# Patient Record
Sex: MALE | Race: WHITE | NOT HISPANIC OR LATINO | Employment: FULL TIME | URBAN - METROPOLITAN AREA
[De-identification: names, ages, dates, MRNs, and addresses within clinical notes are randomized per-mention and may not be internally consistent; named-entity substitution may affect disease eponyms.]

---

## 2022-12-13 ENCOUNTER — HOSPITAL ENCOUNTER (EMERGENCY)
Facility: HOSPITAL | Age: 37
Discharge: HOME | End: 2022-12-13
Attending: EMERGENCY MEDICINE
Payer: COMMERCIAL

## 2022-12-13 VITALS
RESPIRATION RATE: 18 BRPM | HEART RATE: 54 BPM | DIASTOLIC BLOOD PRESSURE: 93 MMHG | SYSTOLIC BLOOD PRESSURE: 170 MMHG | BODY MASS INDEX: 25.18 KG/M2 | TEMPERATURE: 99 F | WEIGHT: 170 LBS | OXYGEN SATURATION: 100 % | HEIGHT: 69 IN

## 2022-12-13 DIAGNOSIS — E80.6 HYPERBILIRUBINEMIA: Primary | ICD-10-CM

## 2022-12-13 DIAGNOSIS — R04.2 HEMOPTYSIS: ICD-10-CM

## 2022-12-13 LAB
ALBUMIN SERPL BCP-MCNC: 4.3 G/DL (ref 3.5–5.2)
ALP SERPL-CCNC: 73 U/L (ref 55–135)
ALT SERPL W/O P-5'-P-CCNC: 41 U/L (ref 10–44)
ANION GAP SERPL CALC-SCNC: 11 MMOL/L (ref 8–16)
APTT BLDCRRT: 28.2 SEC (ref 21–32)
AST SERPL-CCNC: 40 U/L (ref 10–40)
BASOPHILS # BLD AUTO: 0.04 K/UL (ref 0–0.2)
BASOPHILS NFR BLD: 0.5 % (ref 0–1.9)
BILIRUB SERPL-MCNC: 1.5 MG/DL (ref 0.1–1)
BILIRUB UR QL STRIP: NEGATIVE
BNP SERPL-MCNC: 52 PG/ML (ref 0–99)
BUN SERPL-MCNC: 21 MG/DL (ref 6–20)
CALCIUM SERPL-MCNC: 9.6 MG/DL (ref 8.7–10.5)
CHLORIDE SERPL-SCNC: 103 MMOL/L (ref 95–110)
CLARITY UR REFRACT.AUTO: CLEAR
CO2 SERPL-SCNC: 25 MMOL/L (ref 23–29)
COLOR UR AUTO: COLORLESS
CREAT SERPL-MCNC: 1.1 MG/DL (ref 0.5–1.4)
DIFFERENTIAL METHOD: ABNORMAL
EOSINOPHIL # BLD AUTO: 0.1 K/UL (ref 0–0.5)
EOSINOPHIL NFR BLD: 1.1 % (ref 0–8)
ERYTHROCYTE [DISTWIDTH] IN BLOOD BY AUTOMATED COUNT: 12.3 % (ref 11.5–14.5)
EST. GFR  (NO RACE VARIABLE): >60 ML/MIN/1.73 M^2
GLUCOSE SERPL-MCNC: 92 MG/DL (ref 70–110)
GLUCOSE UR QL STRIP: NEGATIVE
HCT VFR BLD AUTO: 44.8 % (ref 40–54)
HCV AB SERPL QL IA: NORMAL
HGB BLD-MCNC: 15.5 G/DL (ref 14–18)
HGB UR QL STRIP: NEGATIVE
HIV 1+2 AB+HIV1 P24 AG SERPL QL IA: NORMAL
IMM GRANULOCYTES # BLD AUTO: 0.03 K/UL (ref 0–0.04)
IMM GRANULOCYTES NFR BLD AUTO: 0.4 % (ref 0–0.5)
INR PPP: 1 (ref 0.8–1.2)
KETONES UR QL STRIP: NEGATIVE
LACTATE SERPL-SCNC: 1 MMOL/L (ref 0.5–2.2)
LEUKOCYTE ESTERASE UR QL STRIP: NEGATIVE
LYMPHOCYTES # BLD AUTO: 1.2 K/UL (ref 1–4.8)
LYMPHOCYTES NFR BLD: 16.2 % (ref 18–48)
MCH RBC QN AUTO: 31.3 PG (ref 27–31)
MCHC RBC AUTO-ENTMCNC: 34.6 G/DL (ref 32–36)
MCV RBC AUTO: 91 FL (ref 82–98)
MONOCYTES # BLD AUTO: 0.4 K/UL (ref 0.3–1)
MONOCYTES NFR BLD: 5.2 % (ref 4–15)
NEUTROPHILS # BLD AUTO: 5.8 K/UL (ref 1.8–7.7)
NEUTROPHILS NFR BLD: 76.6 % (ref 38–73)
NITRITE UR QL STRIP: NEGATIVE
NRBC BLD-RTO: 0 /100 WBC
PH UR STRIP: 5 [PH] (ref 5–8)
PLATELET # BLD AUTO: 249 K/UL (ref 150–450)
PMV BLD AUTO: 9.6 FL (ref 9.2–12.9)
POTASSIUM SERPL-SCNC: 4.3 MMOL/L (ref 3.5–5.1)
PROT SERPL-MCNC: 7.8 G/DL (ref 6–8.4)
PROT UR QL STRIP: NEGATIVE
PROTHROMBIN TIME: 10.4 SEC (ref 9–12.5)
RBC # BLD AUTO: 4.95 M/UL (ref 4.6–6.2)
SODIUM SERPL-SCNC: 139 MMOL/L (ref 136–145)
SP GR UR STRIP: 1 (ref 1–1.03)
TROPONIN I SERPL DL<=0.01 NG/ML-MCNC: 0.01 NG/ML (ref 0–0.03)
URN SPEC COLLECT METH UR: ABNORMAL
WBC # BLD AUTO: 7.57 K/UL (ref 3.9–12.7)

## 2022-12-13 PROCEDURE — 85730 THROMBOPLASTIN TIME PARTIAL: CPT | Performed by: PHYSICIAN ASSISTANT

## 2022-12-13 PROCEDURE — 85025 COMPLETE CBC W/AUTO DIFF WBC: CPT | Performed by: PHYSICIAN ASSISTANT

## 2022-12-13 PROCEDURE — 93308 PR ECHO HEART XTHORACIC,LIMITED: ICD-10-PCS | Mod: 26,,, | Performed by: EMERGENCY MEDICINE

## 2022-12-13 PROCEDURE — 87040 BLOOD CULTURE FOR BACTERIA: CPT | Mod: 59 | Performed by: PHYSICIAN ASSISTANT

## 2022-12-13 PROCEDURE — 87389 HIV-1 AG W/HIV-1&-2 AB AG IA: CPT | Performed by: PHYSICIAN ASSISTANT

## 2022-12-13 PROCEDURE — 85610 PROTHROMBIN TIME: CPT | Performed by: PHYSICIAN ASSISTANT

## 2022-12-13 PROCEDURE — 25500020 PHARM REV CODE 255: Performed by: EMERGENCY MEDICINE

## 2022-12-13 PROCEDURE — 96360 HYDRATION IV INFUSION INIT: CPT

## 2022-12-13 PROCEDURE — 93308 TTE F-UP OR LMTD: CPT | Mod: 26,,, | Performed by: EMERGENCY MEDICINE

## 2022-12-13 PROCEDURE — 99285 EMERGENCY DEPT VISIT HI MDM: CPT | Mod: 25,,, | Performed by: EMERGENCY MEDICINE

## 2022-12-13 PROCEDURE — 81003 URINALYSIS AUTO W/O SCOPE: CPT | Performed by: PHYSICIAN ASSISTANT

## 2022-12-13 PROCEDURE — 84484 ASSAY OF TROPONIN QUANT: CPT | Performed by: PHYSICIAN ASSISTANT

## 2022-12-13 PROCEDURE — 99285 EMERGENCY DEPT VISIT HI MDM: CPT | Mod: 25

## 2022-12-13 PROCEDURE — 83605 ASSAY OF LACTIC ACID: CPT | Performed by: PHYSICIAN ASSISTANT

## 2022-12-13 PROCEDURE — 83880 ASSAY OF NATRIURETIC PEPTIDE: CPT | Performed by: PHYSICIAN ASSISTANT

## 2022-12-13 PROCEDURE — 93005 ELECTROCARDIOGRAM TRACING: CPT

## 2022-12-13 PROCEDURE — 93010 EKG 12-LEAD: ICD-10-PCS | Mod: ,,, | Performed by: INTERNAL MEDICINE

## 2022-12-13 PROCEDURE — 99285 PR EMERGENCY DEPT VISIT,LEVEL V: ICD-10-PCS | Mod: 25,,, | Performed by: EMERGENCY MEDICINE

## 2022-12-13 PROCEDURE — 80053 COMPREHEN METABOLIC PANEL: CPT | Performed by: PHYSICIAN ASSISTANT

## 2022-12-13 PROCEDURE — 86803 HEPATITIS C AB TEST: CPT | Performed by: PHYSICIAN ASSISTANT

## 2022-12-13 PROCEDURE — 93010 ELECTROCARDIOGRAM REPORT: CPT | Mod: ,,, | Performed by: INTERNAL MEDICINE

## 2022-12-13 PROCEDURE — 25000003 PHARM REV CODE 250: Performed by: PHYSICIAN ASSISTANT

## 2022-12-13 RX ORDER — FLUOXETINE 20 MG/1
20 TABLET ORAL DAILY
COMMUNITY

## 2022-12-13 RX ADMIN — IOHEXOL 75 ML: 350 INJECTION, SOLUTION INTRAVENOUS at 05:12

## 2022-12-13 RX ADMIN — SODIUM CHLORIDE 1000 ML: 0.9 INJECTION, SOLUTION INTRAVENOUS at 03:12

## 2022-12-13 NOTE — Clinical Note
"Sean Piña" Drea was seen and treated in our emergency department on 12/13/2022.  He may return to work on 12/14/2022.       If you have any questions or concerns, please don't hesitate to call.      Alma Kincaid PA-C"

## 2022-12-13 NOTE — DISCHARGE INSTRUCTIONS
Your workup today is unremarkable.  No leukocytosis, anemia, platelets dysfunction, electrolyte abnormalities, kidney injury.  PT and PTT within normal limits.  Your cardiac workup is unremarkable.  Your CT does not show signs of a PE or pulmonary disease.  You are negative for HIV and hepatitis-C.  You can refer to your results on [facility].  Follow up with PCP.   Return to the ER for new or worsening symptoms.   Labs Reviewed   CBC W/ AUTO DIFFERENTIAL - Abnormal; Notable for the following components:       Result Value    MCH 31.3 (*)     Gran % 76.6 (*)     Lymph % 16.2 (*)     All other components within normal limits   COMPREHENSIVE METABOLIC PANEL - Abnormal; Notable for the following components:    BUN 21 (*)     Total Bilirubin 1.5 (*)     All other components within normal limits   URINALYSIS, REFLEX TO URINE CULTURE - Abnormal; Notable for the following components:    Color, UA Colorless (*)     All other components within normal limits    Narrative:     Specimen Source->Urine   CULTURE, BLOOD   CULTURE, BLOOD   HIV 1 / 2 ANTIBODY    Narrative:     Release to patient->Immediate   HEPATITIS C ANTIBODY    Narrative:     Release to patient->Immediate   APTT   PROTIME-INR   TROPONIN I   B-TYPE NATRIURETIC PEPTIDE   LACTIC ACID, PLASMA     Imaging Results              CTA Chest Non-Coronary (PE Studies) (Final result)  Result time 12/13/22 17:40:55      Final result by Bala Reilly MD (12/13/22 17:40:55)                   Impression:      1. No evidence of acute pulmonary thromboembolism allowing for poor concentration of contrast in the pulmonary arterial system..  2. If there is a high suspicion, consider V/Q scan or repeat IV contrast with 2nd scan of the pulmonary arterial tree.  3. No evidence of pulmonary nodule, consolidation or cavitary lesion to explain hemoptysis.  4. Negative included images of the neck.  5. Maxillary sinus disease.      Electronically signed by: Bala  Tommie  Date:    12/13/2022  Time:    17:40               Narrative:    EXAMINATION:  CTA CHEST NON CORONARY (PE STUDIES)    CLINICAL HISTORY:  Pulmonary embolism (PE) suspected, unknown D-dimer;    TECHNIQUE:  Following the intravenous administration of 75 cc of Omnipaque 350 contrast material, 1.25 mm contiguous axial images were acquired through the chest utilizing the CT pulmonary angiogram protocol.  2-D coronal and sagittal reconstructed images of the chest and sagittal oblique MIP images of the pulmonary arterial system were generated from the source data.    COMPARISON:  None.    FINDINGS:  Pulmonary arterial system: This is a poor quality examination for the evaluation of pulmonary thromboembolism with moderate opacification of the pulmonary arteries to the level of the segmental branches of the pulmonary arterial system.  The study is in the systemic arterial phase.  There is no evidence of acute pulmonary thromboembolism. No large saddle pulmonary embolism, enlargement of the main pulmonary artery, or secondary findings of right ventricular strain.    Aorta and heart: The heart is normal in size.  No pericardial fluid.  No thoracic aortic aneurysm.  No thoracic aortic dissection.    Airways: Unremarkable.    Lymph nodes: No pathologically enlarged lymph nodes in the chest or axilla.    Lungs: The lungs are clear with no evidence of airspace consolidation, mass, or bronchiectasis.  No emphysematous lung architecture.    Pleura: No significant volume of pleural fluid or pneumothorax.  No pleural based masses.    Visualized upper abdominal and visualized neck soft tissues: No significant abnormalities appreciated.  Mucosal thickening within both maxillary sinuses.    Bones: There is degenerative change of the thoracic spine.

## 2022-12-13 NOTE — ED PROVIDER NOTES
"Encounter Date: 12/13/2022       History     Chief Complaint   Patient presents with    Hemoptysis     Hemoptysis that began today after 11. Not on blood thinners. Has happened before in July of this year. Had CT but didn't find anything. Denies pain.      2:07 PM  Patient is a 37-year-old male with a history of past COVID infection, paroxysmal sinus tach, tendinitis of the right hip, cavitary lesion of the left lobe who presents to [facility] ED with hemoptysis onset today.    Patient is in town for the Hematology conference.  He is a fellow.  He states after lunch he felt like he needed to clear his throat and coughed up blood clots.  States that it was few and little but it lasted about 15-30 minutes.  States that he could taste blood in the back of his throat.  States that this is similar to his episode in July when he had a left upper lobe cavitary lesion and was treated with antibiotics.  He denies feeling ill, prior cough, chest congestion, epistaxis, bloody rhinorrhea, SOB. He had mild upper chest pain, but felt it was MSK.      He denies aspirin use, blood thinner use, chronic NSAID use, hormone use, tobacco use, history of personal cancer.    He did have a flat 5 hour plane ride from [place] to [place].  No lower extremity pain or swelling.    On chart review:  CT scan on 7/18/2022 8:59 PM showed: An ill-defined region of groundglass surrounding cavitary change in the left lung apex is likely etiology of hemoptysis.   Follow up CT about 2 months later showed nearly completely resolution of the previously seen groundglass and cavitary change in the left upper lobe, suggesting resolved infection/inflammation. No new lung lesions.   "He was discharged home with a course of cefpodoxime and azithromycin. All his work up has returned negative including sputum AFB x 3 smears (cultures in process), QuantGold, limited serologies to screen for vasculitis."    Review of patient's allergies indicates:   Allergen " Reactions    Bactrim [sulfamethoxazole-trimethoprim] Other (See Comments)     unknown     Past Medical History:   Diagnosis Date    Generalized anxiety disorder      Past Surgical History:   Procedure Laterality Date    ANTERIOR CRUCIATE LIGAMENT REPAIR Left      History reviewed. No pertinent family history.  Social History     Tobacco Use    Smoking status: Never    Smokeless tobacco: Never   Substance Use Topics    Alcohol use: Yes     Comment: social    Drug use: Never     Review of Systems   Constitutional:  Negative for chills, diaphoresis and fever.   HENT:  Negative for congestion and sore throat (clearing throat).    Respiratory:  Negative for cough and shortness of breath.         +hemoptysis   Cardiovascular:  Negative for chest pain and leg swelling.   Gastrointestinal:  Negative for abdominal pain, blood in stool, diarrhea, nausea and vomiting.   Genitourinary:  Negative for dysuria, frequency and hematuria.   Musculoskeletal:  Negative for back pain and myalgias.   Skin:  Negative for rash.   Neurological:  Negative for weakness and headaches.   Hematological:  Does not bruise/bleed easily.     Physical Exam     Initial Vitals [12/13/22 1253]   BP Pulse Resp Temp SpO2   (!) 176/88 84 18 97.9 °F (36.6 °C) 98 %      MAP       --         Physical Exam    Vitals reviewed.  Constitutional: He appears well-developed and well-nourished. He is not diaphoretic. He is cooperative.  Non-toxic appearance. He does not have a sickly appearance. He does not appear ill. No distress. Face mask in place.   HENT:   Head: Normocephalic and atraumatic. Head is without raccoon's eyes.   Nose: Nose normal. No epistaxis.   Mouth/Throat: Uvula is midline, oropharynx is clear and moist and mucous membranes are normal. No oral lesions. No trismus in the jaw. No oropharyngeal exudate, posterior oropharyngeal edema, posterior oropharyngeal erythema or tonsillar abscesses.   Eyes: Conjunctivae and EOM are normal. No scleral  icterus.   Neck:   Normal range of motion.  Cardiovascular:  Normal rate and regular rhythm.           Pulmonary/Chest: Breath sounds normal. No accessory muscle usage. No tachypnea. No respiratory distress. He has no wheezes. He has no rhonchi. He has no rales.   Abdominal: He exhibits no distension.   Musculoskeletal:         General: Normal range of motion.      Cervical back: Normal range of motion. No rigidity. Normal range of motion.      Right lower leg: No edema.      Left lower leg: No edema.     Neurological: He is alert. He has normal strength.   Skin: Skin is dry. No pallor.       ED Course   Procedures  Labs Reviewed   CBC W/ AUTO DIFFERENTIAL - Abnormal; Notable for the following components:       Result Value    MCH 31.3 (*)     Gran % 76.6 (*)     Lymph % 16.2 (*)     All other components within normal limits   COMPREHENSIVE METABOLIC PANEL - Abnormal; Notable for the following components:    BUN 21 (*)     Total Bilirubin 1.5 (*)     All other components within normal limits   URINALYSIS, REFLEX TO URINE CULTURE - Abnormal; Notable for the following components:    Color, UA Colorless (*)     All other components within normal limits    Narrative:     Specimen Source->Urine   CULTURE, BLOOD   CULTURE, BLOOD   HIV 1 / 2 ANTIBODY    Narrative:     Release to patient->Immediate   HEPATITIS C ANTIBODY    Narrative:     Release to patient->Immediate   APTT   PROTIME-INR   TROPONIN I   B-TYPE NATRIURETIC PEPTIDE   LACTIC ACID, PLASMA        ECG Results              EKG 12-lead (Final result)  Result time 12/13/22 16:16:34      Final result by Interface, Lab In [facility] (12/13/22 16:16:34)                   Narrative:    Test Reason : R04.2,    Vent. Rate : 056 BPM     Atrial Rate : 056 BPM     P-R Int : 146 ms          QRS Dur : 084 ms      QT Int : 418 ms       P-R-T Axes : 061 053 044 degrees     QTc Int : 403 ms    Sinus bradycardia with sinus arrhythmia  Otherwise normal ECG  No previous ECGs  available  Confirmed by Sean Dimas MD ([id number]) on 12/13/2022 4:16:28 PM    Referred By: [email or web address]   SELF           Confirmed By:Sean Dimas MD                                  Imaging Results              CTA Chest Non-Coronary (PE Studies) (Final result)  Result time 12/13/22 17:40:55      Final result by Bala Reilly MD (12/13/22 17:40:55)                   Impression:      1. No evidence of acute pulmonary thromboembolism allowing for poor concentration of contrast in the pulmonary arterial system..  2. If there is a high suspicion, consider V/Q scan or repeat IV contrast with 2nd scan of the pulmonary arterial tree.  3. No evidence of pulmonary nodule, consolidation or cavitary lesion to explain hemoptysis.  4. Negative included images of the neck.  5. Maxillary sinus disease.      Electronically signed by: Bala Reilly  Date:    12/13/2022  Time:    17:40               Narrative:    EXAMINATION:  CTA CHEST NON CORONARY (PE STUDIES)    CLINICAL HISTORY:  Pulmonary embolism (PE) suspected, unknown D-dimer;    TECHNIQUE:  Following the intravenous administration of 75 cc of Omnipaque 350 contrast material, 1.25 mm contiguous axial images were acquired through the chest utilizing the CT pulmonary angiogram protocol.  2-D coronal and sagittal reconstructed images of the chest and sagittal oblique MIP images of the pulmonary arterial system were generated from the source data.    COMPARISON:  None.    FINDINGS:  Pulmonary arterial system: This is a poor quality examination for the evaluation of pulmonary thromboembolism with moderate opacification of the pulmonary arteries to the level of the segmental branches of the pulmonary arterial system.  The study is in the systemic arterial phase.  There is no evidence of acute pulmonary thromboembolism. No large saddle pulmonary embolism, enlargement of the main pulmonary artery, or secondary findings of right ventricular strain.    Aorta and heart: The  heart is normal in size.  No pericardial fluid.  No thoracic aortic aneurysm.  No thoracic aortic dissection.    Airways: Unremarkable.    Lymph nodes: No pathologically enlarged lymph nodes in the chest or axilla.    Lungs: The lungs are clear with no evidence of airspace consolidation, mass, or bronchiectasis.  No emphysematous lung architecture.    Pleura: No significant volume of pleural fluid or pneumothorax.  No pleural based masses.    Visualized upper abdominal and visualized neck soft tissues: No significant abnormalities appreciated.  Mucosal thickening within both maxillary sinuses.    Bones: There is degenerative change of the thoracic spine.                                       Medications   sodium chloride 0.9% bolus 1,000 mL 1,000 mL (0 mLs Intravenous Stopped 12/13/22 1630)   iohexoL (OMNIPAQUE 350) injection 75 mL (75 mLs Intravenous Given 12/13/22 1726)     Medical Decision Making:   History:   Old Medical Records: I decided to obtain old medical records.  Old Records Summarized: records from clinic visits, records from previous admission(s) and records from another hospital.  Initial Assessment:   Patient is a 37-year-old male with a history of past COVID infection, paroxysmal sinus tach, tendinitis of the right hip, cavitary lesion of the left lobe who presents to [facility] ED with hemoptysis onset today.  Differential Diagnosis:   Includes but is not limited to bronchitis, bronchiectasis, viral syndrome, anemia, pneumonia, PE, platelet dysfunction.  Clinical Tests:   Lab Tests: Ordered and Reviewed  Radiological Study: Reviewed and Ordered  Medical Tests: Ordered and Reviewed  ED Management:  Patient had a previous COVID infection.  He also was on a 5 hour flight.  These are his risk factors for hypercoagulability.  He has a history of cavitary lesion but his workup was negative for any concerning infections.  He cleared his throat today and cough up blood, although has not been feeling sick or had  prior URI symptoms.  Will initiate workup, obtain CT scan of chest including angiogram to look for a PE, hydrate, and continue monitor.          Attending Attestation:     Physician Attestation Statement for NP/PA:   I have conducted a face to face encounter with this patient in addition to the NP/PA, due to Medical Complexity    Other NP/PA Attestation Additions:    History of Present Illness: Recurrent hemoptysis in the setting of remote h/o cavitary pulmonary lesion   Physical Exam: General: AO x 3, NAD. Well nourished. Well Developed  Head: Normocephalic and Atraumatic  HEENT: PERRLA. EOMI. OP Clear  Neck: Supple, Nontender in midline.  Cardiovascular: RRR. No m/r/g. 2+ Distal Pulses  Pulm/Chest: Chest nontender. Lungs clear to auscultation. No respiratory distress.  Abdomen: Nontender. Nondistended. No rigidity, rebound, or guarding  MSK: extremities atraumatic x 4. Gait normal  Ext: no clubbing, cyanosis, or edema  Neuro: GCS 15. CN II-XII intact. Intact symmetric sensation, strength, DTR x 4 extremities  Skin: no bullae, petechiae, or purpura. Warm, dry, and intact.  Psych: normal mood and affect.     Medical Decision Making: Concern for atypical infection, bronchiectasis, or pulmonary embolism. CT to r/o PE ordered, pending at time of turnover. Patient remained nontoxic and well appearing through course w/o hemoptysis in emergency dept.           ED Course as of 12/13/22 1900   Tue Dec 13, 2022   1348 BP(!): 176/88 [CL]   1348 Temp: 97.9 °F (36.6 °C) [CL]   1348 Pulse: 84 [CL]   1348 Resp: 18 [CL]   1348 SpO2: 98 % [CL]   1609 WBC: 7.57 [CL]   1609 HEMOGLOBIN: 15.5 [CL]   1609 Platelets: 249 [CL]   1609 Sodium: 139 [CL]   1609 Potassium: 4.3 [CL]   1609 Chloride: 103 [CL]   1609 Glucose: 92 [CL]   1609 Creatinine: 1.1 [CL]   1609 BILIRUBIN TOTAL(!): 1.5 [CL]   1609 AST: 40 [CL]   1609 ALT: 41 [CL]   1610 INR: 1.0 [CL]   1610 aPTT: 28.2 [CL]   1610 Lactate, Andrew: 1.0 [CL]   1610 Troponin I: 0.009 [CL]   1610  BNP: 52 [CL]   1610 HIV 1/2 Ag/Ab: Non-reactive [CL]   1610 Hepatitis C Ab: Non-reactive [CL]      ED Course User Index  [CL] Alma Kincaid PA-C          CTA without signs of acute PE.  No evidence of pulmonary nodule, consolidation, or cavitary lesion.  Negative images of the neck.    Patient reassessed.  Vitals remained stable.  His workup today is unrevealing.  He has not reported any new episodes of hemoptysis while here.  He was updated with his labs and imaging test.  Mild hyperbilirubinemia, but he does not have abd pain or other elevated LFTs. I doubt acute surgical abd. I have not found any life-threatening conditions causing his transient hemoptysis.  Will have patient closely follow-up with his providers back home in [place].  He was provided with impression printout as well as CD of his CTA.  All of his questions were answered.  Patient comfortable with plan and stable for discharge.        I have reviewed patient's chart and discussed this case with my supervising MD.     Alma Kincaid PA-C  Emergent Department  [facility]  Spectralink #[phone] or #[phone]    Clinical Impression:   Final diagnoses:  [R04.2] Hemoptysis  [E80.6] Hyperbilirubinemia (Primary)        ED Disposition Condition    Discharge Stable          ED Prescriptions    None       Follow-up Information       Follow up With Specialties Details Why Contact Info    Hesham BILLINGS MD  Schedule an appointment as soon as possible for a visit   [address]  [place] [address]  [phone]      [facility] - Emergency Dept Emergency Medicine  If symptoms worsen [address] [facility] [id number] [phone]             Alma Kincaid PA-C  12/13/22 1900       Orlando Hollis MD  12/14/22 [id number]

## 2022-12-13 NOTE — ED NOTES
Pt reports hemoptysis that started today around 1100 between 5 to 10 times within a 30 min period. Dark red clots appearance. Denies chest pain, SOB, fever, night sweats, chills, weight loss. Had similar occurrence back in July 2022 and was treated for atypical PNA. Had TB workup then and it was negative.

## 2022-12-18 LAB
BACTERIA BLD CULT: NORMAL
BACTERIA BLD CULT: NORMAL